# Patient Record
(demographics unavailable — no encounter records)

---

## 2025-01-15 NOTE — HISTORY OF PRESENT ILLNESS
[FreeTextEntry1] : 42 year old M with HLD (last ) who presents for cardiac evaluation.  Meds: Crestor 10 daily  Pt reports intermittent palpitations, usually at night-time, lasting for approximately 1 minute which self-resolve on its own. This past Saturday, he was driving and was angry. He started to feel diffuse chest discomfort and SOB to the point he had to stop and rest for approximately a minute before the symptoms went away. Since then, he states he has not "felt right" and has been feeling consistent mid-sternal chest pressure. Otherwise, he denies f/c, cough, or recent viral illness.

## 2025-01-15 NOTE — ASSESSMENT
[FreeTextEntry1] : 42 year old M with HLD (last ) who presents for cardiac evaluation.  1) Chest discomfort, atypical in nature 2) HLD - EKG 1/15/25 showed sinus rhythm without ischemic changes - I advised pt to undergo treadmill stress. Pt did 9:30 min Felipe protocol and did not have ischemic ECG changes - Pt underwent TTE 1/15/25 which showed normal LV and RV function without significant valvular disease - Pt was reassured that his symptoms are less likely cardiac related - Repeat labs today. If LDL remains elevated, will increase statin  3) Palpitations - Check 1 week monitor  4) Elevated BP - Repeat BP was 150/90. On review of previous office visits, most BPs were greater than 140/90. Will discuss medication with patient  5) Follow-up, pending monitor

## 2025-04-01 NOTE — HISTORY OF PRESENT ILLNESS
[de-identified] : COMES FOR CPE M SEEN BY CARDIO RECENTLY WITH EKG ,ECHO AND STRESS TEST FOR CP ;LDL OVER 160 ,ROSUVASTATIN INCREASED TO 20 MG DAILY [No Pertinent Cardiac History] : no history of aortic stenosis, atrial fibrillation, coronary artery disease, recent myocardial infarction, or implantable device/pacemaker [No Pertinent Pulmonary History] : no history of asthma, COPD, sleep apnea, or smoking [Chronic Anticoagulation] : no chronic anticoagulation [Chronic Kidney Disease] : no chronic kidney disease [Diabetes] : no diabetes [FreeTextEntry1] : CYSTOSCOPY [FreeTextEntry4] : PT COMES FOR PREOP CYSTOSCOPY

## 2025-04-01 NOTE — HISTORY OF PRESENT ILLNESS
[de-identified] : COMES FOR CPE M SEEN BY CARDIO RECENTLY WITH EKG ,ECHO AND STRESS TEST FOR CP ;LDL OVER 160 ,ROSUVASTATIN INCREASED TO 20 MG DAILY [No Pertinent Cardiac History] : no history of aortic stenosis, atrial fibrillation, coronary artery disease, recent myocardial infarction, or implantable device/pacemaker [No Pertinent Pulmonary History] : no history of asthma, COPD, sleep apnea, or smoking [Chronic Anticoagulation] : no chronic anticoagulation [Chronic Kidney Disease] : no chronic kidney disease [Diabetes] : no diabetes [FreeTextEntry1] : CYSTOSCOPY [FreeTextEntry4] : PT COMES FOR PREOP CYSTOSCOPY

## 2025-04-01 NOTE — ASSESSMENT
[FreeTextEntry1] : CPE OF 43 Y OLD MALE WITH MILD OBESITY ,DYSLIPIDEMIA AND LOW VIT D= LABS ORDERED RTO 3-4 M  [Patient Optimized for Surgery] : Patient optimized for surgery [No Further Testing Recommended] : no further testing recommended [Continue medications as is] : Continue current medications [As per surgery] : as per surgery [FreeTextEntry4] : PT AT ACCEPTABLE RISK FOR SURGERY

## 2025-04-24 NOTE — HISTORY OF PRESENT ILLNESS
[FreeTextEntry1] :   1/25/25 - Pt reports intermittent palpitations, usually at night-time, lasting for approximately 1 minute which self-resolve on its own. This past Saturday, he was driving and was angry. He started to feel diffuse chest discomfort and SOB to the point he had to stop and rest for approximately a minute before the symptoms went away. Since then, he states he has not "felt right" and has been feeling consistent mid-sternal chest pressure. Otherwise, he denies f/c, cough, or recent viral illness.

## 2025-04-24 NOTE — REASON FOR VISIT
[FreeTextEntry1] : 43 year old M with HLD who presents for f/u.  Meds: Crestor 20mg daily, Toprol 25mg XL daily  STR 1/15/25 - Pt did 9:30 min Felipe protocol and did not have ischemic ECG changes TTE 1/15/25 showed normal LV and RV function without significant valvular disease 1 week monitor showed sinus rhythm , avg 72 bpm, rare PACs, rare PVCs, 1 5 beat run of NSVT, fastest 147 bpm.

## 2025-04-24 NOTE — ASSESSMENT
[FreeTextEntry1] : 1) Chest discomfort, atypical in nature 2) HLD - EKG 1/15/25 showed sinus rhythm without ischemic changes - I advised pt to undergo treadmill stress. Pt did 9:30 min Felipe protocol and did not have ischemic ECG changes - Pt underwent TTE 1/15/25 which showed normal LV and RV function without significant valvular disease - Pt was reassured that his symptoms are less likely cardiac related - Repeat labs today. If LDL remains elevated, will increase statin  3) Palpitations - Check 1 week monitor  4) Elevated BP - Repeat BP was 150/90. On review of previous office visits, most BPs were greater than 140/90. Will discuss medication with patient  5) Follow-up,

## 2025-05-02 NOTE — ASSESSMENT
[FreeTextEntry1] : 43 year old M with HLD (non-compliant with meds) who presents for f/u.  1) Pre-op cardiac clearance, for cystoscopy under anesthesia - EKG 5/2/25 showed sinus rhythm without significant abnormalities - His RCRI score is 0 which puts him at low cardiac risk for planned cystoscopy - Treadmill stress 1/15/25 - Pt did 9:30 min Felipe protocol and did not have ischemic ECG changes - TTE 1/15/25 showed normal LV and RV function without significant valvular disease - There is no evidence of active ACS, arrhythmia, clinical heart failure or significant valvular disease. He is medically optimized from cardiac standpoint and may proceed to planned cystoscopy under anesthesia without further cardiac testing.  2) HLD - I advised pt to continue Crestor 20mg daily. However, he states he doesn't want to take medication - Dietary/lifestyle changes discussed  3) Elevated BP 4) Palpitations - Discussed that Toprol may help with his blood pressure and also palpitations. He continues to decline medication despite risks/benefits discussion  5) Follow-up, 6 months

## 2025-05-02 NOTE — REASON FOR VISIT
[Symptom and Test Evaluation] : symptom and test evaluation [Other: ____] : [unfilled] [FreeTextEntry3] : Dr. Anam Freitas [FreeTextEntry1] : 43 year old M with HLD (non-compliant with meds) who presents for f/u.  Meds (non-compliant) Crestor 20mg daily, Toprol 25mg XL daily  STR 1/15/25 - Pt did 9:30 min Felipe protocol and did not have ischemic ECG changes TTE 1/15/25 showed normal LV and RV function without significant valvular disease 1 week monitor showed sinus rhythm , avg 72 bpm, rare PACs, rare PVCs, 1 5 beat run of NSVT, fastest 147 bpm.

## 2025-05-02 NOTE — HISTORY OF PRESENT ILLNESS
[FreeTextEntry1] : Pt needs pre-op cardiac clearance for cystoscopy under anesthesia. He reports stable exercise tolerance. No changes since last visit. He still occasionally has palpitations but he does not want to take any medications.  1/25/25 - Pt reports intermittent palpitations, usually at night-time, lasting for approximately 1 minute which self-resolve on its own. This past Saturday, he was driving and was angry. He started to feel diffuse chest discomfort and SOB to the point he had to stop and rest for approximately a minute before the symptoms went away. Since then, he states he has not "felt right" and has been feeling consistent mid-sternal chest pressure. Otherwise, he denies f/c, cough, or recent viral illness.

## 2025-06-23 NOTE — HISTORY OF PRESENT ILLNESS
[FreeTextEntry1] : Very pleasant 43-year-old gentleman who presents for evaluation of indeterminate renal lesions, dysuria, suprapubic discomfort, bladder wall thickening.  He reports that he went to the emergency department approximately 3 months ago with complaints of suprapubic discomfort and flank pain.  A CT scan was performed which demonstrated no kidney stones, hydronephrosis, however did demonstrate indeterminate renal hypodensities for which an MRI was recommended.  He reports that suprapubic discomfort has persisted.  CT scan also demonstrated a thickened bladder wall.  He reports no history of urinary tract infections nor sexually transmitted infections.  No urethral discharge.

## 2025-06-23 NOTE — ASSESSMENT
[FreeTextEntry1] : Very pleasant 43-year-old gentleman who presents for evaluation of suprapubic discomfort, dysuria, indeterminate renal lesions, bladder wall thickening - CT images reviewed demonstrating bladder wall thickening and indeterminate renal lesions - MRI renal mass protocol - Discussed recommendation to consider a cystoscopy.  He would like to wait until results of additional testing returned prior to proceeding with a cystoscopy - Urinalysis negative for blood - GC/chlamydia - Ureaplasma/mycoplasma - Patient reports an extensive family history of nonurologic cancers.  PSA 1.09.  We discussed that his risk for prostate cancer is low - Follow-up after MRI  Patient is being seen today for evaluation and management of a chronic and longitudinal ongoing condition and I am the primary treating physician